# Patient Record
Sex: FEMALE | Race: WHITE | NOT HISPANIC OR LATINO | Employment: FULL TIME | URBAN - NONMETROPOLITAN AREA
[De-identification: names, ages, dates, MRNs, and addresses within clinical notes are randomized per-mention and may not be internally consistent; named-entity substitution may affect disease eponyms.]

---

## 2022-05-09 ENCOUNTER — OFFICE VISIT (OUTPATIENT)
Dept: CARDIOLOGY | Facility: CLINIC | Age: 32
End: 2022-05-09

## 2022-05-09 VITALS
BODY MASS INDEX: 27.64 KG/M2 | SYSTOLIC BLOOD PRESSURE: 104 MMHG | HEIGHT: 63 IN | HEART RATE: 54 BPM | DIASTOLIC BLOOD PRESSURE: 73 MMHG | OXYGEN SATURATION: 97 % | WEIGHT: 156 LBS

## 2022-05-09 DIAGNOSIS — R06.02 SHORTNESS OF BREATH: ICD-10-CM

## 2022-05-09 DIAGNOSIS — R07.89 CHEST PAIN, ATYPICAL: Primary | ICD-10-CM

## 2022-05-09 DIAGNOSIS — R00.2 PALPITATIONS: ICD-10-CM

## 2022-05-09 PROCEDURE — 99204 OFFICE O/P NEW MOD 45 MIN: CPT | Performed by: NURSE PRACTITIONER

## 2022-05-09 PROCEDURE — 93000 ELECTROCARDIOGRAM COMPLETE: CPT | Performed by: NURSE PRACTITIONER

## 2022-05-09 RX ORDER — ACETAMINOPHEN AND CODEINE PHOSPHATE 120; 12 MG/5ML; MG/5ML
1 SOLUTION ORAL DAILY
COMMUNITY

## 2022-05-09 NOTE — PROGRESS NOTES
Subjective     Yuni Gordon is a 31 y.o. female who presents to day for Palpitations, Chest Pain, and Shortness of Breath.    CHIEF COMPLIANT  Chief Complaint   Patient presents with   • Palpitations   • Chest Pain   • Shortness of Breath       Active Problems:  Problem List Items Addressed This Visit    None     Visit Diagnoses     Chest pain, atypical    -  Primary    Relevant Orders    Adult Transthoracic Echo Complete W/ Cont if Necessary Per Protocol    Holter Monitor - 24 Hour    Palpitations        Relevant Orders    Adult Transthoracic Echo Complete W/ Cont if Necessary Per Protocol    Holter Monitor - 24 Hour    Shortness of breath        Relevant Orders    Adult Transthoracic Echo Complete W/ Cont if Necessary Per Protocol    Holter Monitor - 24 Hour      Problem list  1.  Palpitations  2.  Chest pain  3.  Shortness of breath  4.  COVID-19 x2 last in February    HPI  HPI  Ms. Gordon is a 31-year-old female patient who is being seen today to establish care for cardiac evaluation due to chest pain, shortness of breath and palpitations.  Patient reports that this started after her second round of COVID.  Patient reports that she has palpitations where she has an intermittent heart beating type sensation and racing in her chest that occurs intermittently.  She says her heart rate gets up to 125 bpm.  She says her normal resting heart rates in the mid 50s.  She says that she does not really feel like she needs to pass out but she does feel like she needs to sit down to allow it to pass.  Usually resolves pretty quickly.  She did have a repeat viral infection in February that continue to exacerbate her symptoms.  She had body aches increased heart rate and blood pressure during these 6 days of the viral infection.  She does develop chest tightens in these episodes.  She is cut out caffeine and she is very active and exercises routinely.  She has had 1-2 episodes since the latest viral infection.  She is 10 months  postpartum and continues to breast-feed.  She does report that she still has these episodes approximately 1 time a month.  Her last episode was 2 weeks ago.  She has not identified any particular triggers they usually occur at random but she has noticed that they usually occur during her workday.  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  We did evaluated her labs that was provided by her PCP that showed a normal CBC, CMP, and TSH.  She denies any fatigue, lower extremity edema, dizziness, lightheadedness, syncope, PND, orthopnea, or strokelike symptoms.            PRIOR MEDS  Current Outpatient Medications on File Prior to Visit   Medication Sig Dispense Refill   • norethindrone (Raissa) 0.35 MG tablet Take 1 tablet by mouth Daily.       No current facility-administered medications on file prior to visit.       ALLERGIES  Patient has no known allergies.    HISTORY  Past Medical History:   Diagnosis Date   • Known health problems: none        Social History     Socioeconomic History   • Marital status:    Tobacco Use   • Smoking status: Never Smoker   • Smokeless tobacco: Never Used   Vaping Use   • Vaping Use: Never used   Substance and Sexual Activity   • Alcohol use: Yes     Comment: once a month 2-3 beer or wine   • Drug use: Never   • Sexual activity: Defer       Family History   Problem Relation Age of Onset   • Hypothyroidism Mother        Review of Systems   Constitutional: Negative for chills, fatigue and fever.   HENT: Negative for congestion, rhinorrhea and sore throat.    Respiratory: Positive for shortness of breath. Negative for chest tightness.    Cardiovascular: Positive for chest pain and palpitations (hard beats/ racing/ 125 hrt at rest). Negative for leg swelling.   Gastrointestinal: Negative for constipation, diarrhea and nausea.   Musculoskeletal: Positive for back pain and neck pain. Negative for arthralgias.   Allergic/Immunologic: Positive for environmental  "allergies. Negative for food allergies.   Neurological: Positive for weakness (when having episodes). Negative for dizziness, syncope and light-headedness.   Hematological: Does not bruise/bleed easily.   Psychiatric/Behavioral: Negative for sleep disturbance.       Objective     VITALS: /73 (BP Location: Left arm, Patient Position: Sitting)   Pulse 54   Ht 160 cm (63\")   Wt 70.8 kg (156 lb)   SpO2 97%   BMI 27.63 kg/m²     LABS:   Lab Results (most recent)     None          IMAGING:   No Images in the past 120 days found..    EXAM:  Physical Exam  Vitals and nursing note reviewed.   Constitutional:       Appearance: She is well-developed.   HENT:      Head: Normocephalic.   Eyes:      Pupils: Pupils are equal, round, and reactive to light.   Neck:      Thyroid: No thyroid mass.      Vascular: No carotid bruit or JVD.      Trachea: Trachea and phonation normal.   Cardiovascular:      Rate and Rhythm: Normal rate and regular rhythm.      Pulses:           Radial pulses are 2+ on the right side and 2+ on the left side.        Dorsalis pedis pulses are 2+ on the right side and 2+ on the left side.        Posterior tibial pulses are 2+ on the right side and 2+ on the left side.      Heart sounds: Normal heart sounds. No murmur heard.    No friction rub. No gallop.   Pulmonary:      Effort: Pulmonary effort is normal. No respiratory distress.      Breath sounds: Normal breath sounds. No wheezing or rales.   Abdominal:      General: Bowel sounds are normal.      Palpations: Abdomen is soft.   Musculoskeletal:         General: No swelling. Normal range of motion.      Cervical back: Neck supple.   Skin:     General: Skin is warm and dry.      Capillary Refill: Capillary refill takes less than 2 seconds.      Findings: No rash.   Neurological:      Mental Status: She is alert and oriented to person, place, and time.   Psychiatric:         Speech: Speech normal.         Behavior: Behavior normal.         Thought " Content: Thought content normal.         Judgment: Judgment normal.         Procedure     ECG 12 Lead    Date/Time: 5/9/2022 10:12 AM  Performed by: Adrian Alegre APRN  Authorized by: Adrian Alegre APRN   Previous ECG: no previous ECG available  Rhythm: sinus bradycardia  Rate: bradycardic  BPM: 51  QRS axis: normal  Comments:  ms  No acute changes               Assessment/Plan    Diagnosis Plan   1. Chest pain, atypical  Adult Transthoracic Echo Complete W/ Cont if Necessary Per Protocol    Holter Monitor - 24 Hour   2. Palpitations  Adult Transthoracic Echo Complete W/ Cont if Necessary Per Protocol    Holter Monitor - 24 Hour   3. Shortness of breath  Adult Transthoracic Echo Complete W/ Cont if Necessary Per Protocol    Holter Monitor - 24 Hour   1.  Due to patient's palpitations that lead to shortness of breath and chest pain status post COVID and other viral infections I do feel it is appropriate to get an echocardiogram for further evaluation of her structural anatomy as well as potential causes of her symptoms.  2.  Due to patient's palpitations that are symptomatic and interfere with her activities of daily living when they occur I would like for her to wear a Holter monitor 14 days to help determine the etiology of her palpitations.  We did review her labs which had no significant abnormalities.  3.  Informed of signs and symptoms of ACS and advised to seek emergent treatment for any new worsening symptoms.  Patient also advised sooner follow-up as needed.  Also advised to follow-up with family doctor as needed  This note is dictated utilizing voice recognition software.  Although this record has been proof read, transcriptional errors may still be present. If questions occur regarding the content of this record please do not hesitate to call our office.  I have reviewed and confirmed the accuracy of the ROS as documented by the MA/LPN/RN REGIS Trotter    Return in about 3 months  (around 8/9/2022), or if symptoms worsen or fail to improve.    Diagnoses and all orders for this visit:    1. Chest pain, atypical (Primary)  -     Adult Transthoracic Echo Complete W/ Cont if Necessary Per Protocol; Future  -     Holter Monitor - 24 Hour; Future    2. Palpitations  -     Adult Transthoracic Echo Complete W/ Cont if Necessary Per Protocol; Future  -     Holter Monitor - 24 Hour; Future    3. Shortness of breath  -     Adult Transthoracic Echo Complete W/ Cont if Necessary Per Protocol; Future  -     Holter Monitor - 24 Hour; Future    Other orders  -     ECG 12 Lead        Yuni Gordon  reports that she has never smoked. She has never used smokeless tobacco.. I have educated her on the risk of diseases from using tobacco products.          MEDS ORDERED DURING VISIT:  No orders of the defined types were placed in this encounter.          This document has been electronically signed by Adrian Alegre Jr., APRN  May 9, 2022 10:12 EDT

## 2022-06-24 ENCOUNTER — APPOINTMENT (OUTPATIENT)
Dept: CARDIOLOGY | Facility: HOSPITAL | Age: 32
End: 2022-06-24